# Patient Record
Sex: FEMALE | Race: WHITE | NOT HISPANIC OR LATINO | Employment: OTHER | URBAN - METROPOLITAN AREA
[De-identification: names, ages, dates, MRNs, and addresses within clinical notes are randomized per-mention and may not be internally consistent; named-entity substitution may affect disease eponyms.]

---

## 2018-10-18 ENCOUNTER — HOSPITAL ENCOUNTER (EMERGENCY)
Facility: HOSPITAL | Age: 53
Discharge: HOME | End: 2018-10-18
Attending: EMERGENCY MEDICINE
Payer: COMMERCIAL

## 2018-10-18 VITALS
OXYGEN SATURATION: 98 % | SYSTOLIC BLOOD PRESSURE: 165 MMHG | RESPIRATION RATE: 18 BRPM | HEART RATE: 102 BPM | TEMPERATURE: 98.2 F | DIASTOLIC BLOOD PRESSURE: 108 MMHG

## 2018-10-18 DIAGNOSIS — F41.9 ANXIETY: ICD-10-CM

## 2018-10-18 DIAGNOSIS — K21.00 GASTROESOPHAGEAL REFLUX DISEASE WITH ESOPHAGITIS: Primary | ICD-10-CM

## 2018-10-18 PROCEDURE — 94640 AIRWAY INHALATION TREATMENT: CPT

## 2018-10-18 PROCEDURE — 99283 EMERGENCY DEPT VISIT LOW MDM: CPT

## 2018-10-18 RX ORDER — FAMOTIDINE 20 MG/1
20 TABLET, FILM COATED ORAL ONCE
Status: COMPLETED | OUTPATIENT
Start: 2018-10-18 | End: 2018-10-18

## 2018-10-18 RX ORDER — DULOXETIN HYDROCHLORIDE 60 MG/1
60 CAPSULE, DELAYED RELEASE ORAL DAILY
COMMUNITY

## 2018-10-18 RX ORDER — RANITIDINE 150 MG/1
150 TABLET ORAL 2 TIMES DAILY
Qty: 20 TABLET | Refills: 0 | Status: SHIPPED | OUTPATIENT
Start: 2018-10-18

## 2018-10-18 RX ORDER — MONTELUKAST SODIUM 10 MG/1
10 TABLET ORAL DAILY
COMMUNITY

## 2018-10-18 RX ORDER — SODIUM CHLORIDE FOR INHALATION 0.9 %
3 VIAL, NEBULIZER (ML) INHALATION ONCE
Status: COMPLETED | OUTPATIENT
Start: 2018-10-18 | End: 2018-10-18

## 2018-10-18 RX ORDER — SODIUM CHLORIDE FOR INHALATION 0.9 %
VIAL, NEBULIZER (ML) INHALATION
Status: DISCONTINUED
Start: 2018-10-18 | End: 2018-10-18 | Stop reason: HOSPADM

## 2018-10-18 RX ORDER — LOVASTATIN 40 MG/1
40 TABLET ORAL DAILY
COMMUNITY

## 2018-10-18 RX ORDER — ALUMINA, MAGNESIA, AND SIMETHICONE 2400; 2400; 240 MG/30ML; MG/30ML; MG/30ML
10 SUSPENSION ORAL EVERY 6 HOURS PRN
Qty: 355 ML | Refills: 0 | Status: SHIPPED | OUTPATIENT
Start: 2018-10-18

## 2018-10-18 RX ORDER — MAGNESIUM HYDROXIDE/ALUMINUM HYDROXICE/SIMETHICONE 120; 1200; 1200 MG/30ML; MG/30ML; MG/30ML
30 SUSPENSION ORAL ONCE
Status: COMPLETED | OUTPATIENT
Start: 2018-10-18 | End: 2018-10-18

## 2018-10-18 RX ORDER — GABAPENTIN 300 MG/1
800 CAPSULE ORAL 4 TIMES DAILY
COMMUNITY
End: 2021-08-16

## 2018-10-18 RX ORDER — IPRATROPIUM BROMIDE AND ALBUTEROL SULFATE 2.5; .5 MG/3ML; MG/3ML
3 SOLUTION RESPIRATORY (INHALATION) ONCE
Status: COMPLETED | OUTPATIENT
Start: 2018-10-18 | End: 2018-10-18

## 2018-10-18 RX ORDER — TRAMADOL HYDROCHLORIDE 50 MG/1
50 TABLET ORAL EVERY 6 HOURS PRN
COMMUNITY

## 2018-10-18 RX ORDER — CLONAZEPAM 1 MG/1
1 TABLET ORAL 2 TIMES DAILY PRN
COMMUNITY

## 2018-10-18 RX ADMIN — ALUMINUM HYDROXIDE, MAGNESIUM HYDROXIDE, AND SIMETHICONE 30 ML: 200; 200; 20 SUSPENSION ORAL at 04:45

## 2018-10-18 RX ADMIN — FAMOTIDINE 20 MG: 20 TABLET ORAL at 05:31

## 2018-10-18 RX ADMIN — IPRATROPIUM BROMIDE AND ALBUTEROL SULFATE 3 ML: 2.5; .5 SOLUTION RESPIRATORY (INHALATION) at 04:50

## 2018-10-18 RX ADMIN — LIDOCAINE HYDROCHLORIDE 10 ML: 20 SOLUTION ORAL; TOPICAL at 04:45

## 2018-10-18 RX ADMIN — ISODIUM CHLORIDE 3 ML: 0.03 SOLUTION RESPIRATORY (INHALATION) at 04:50

## 2018-10-18 NOTE — DISCHARGE INSTRUCTIONS
Anxiety   WHAT YOU NEED TO KNOW:   Anxiety is a condition that causes you to feel extremely worried or nervous  The feelings are so strong that they can cause problems with your daily activities or sleep  Anxiety may be triggered by something you fear, or it may happen without a cause  Family or work stress, smoking, caffeine, and alcohol can increase your risk for anxiety  Certain medicines or health conditions can also increase your risk  Anxiety can become a long-term condition if it is not managed or treated  DISCHARGE INSTRUCTIONS:   Call 911 if:   · You have chest pain, tightness, or heaviness that may spread to your shoulders, arms, jaw, neck, or back  · You feel like hurting yourself or someone else  Contact your healthcare provider if:   · Your symptoms get worse or do not get better with treatment  · Your anxiety keeps you from doing your regular daily activities  · You have new symptoms since your last visit  · You have questions or concerns about your condition or care  Medicines:   · Medicines  may be given to help you feel more calm and relaxed, and decrease your symptoms  · Take your medicine as directed  Contact your healthcare provider if you think your medicine is not helping or if you have side effects  Tell him of her if you are allergic to any medicine  Keep a list of the medicines, vitamins, and herbs you take  Include the amounts, and when and why you take them  Bring the list or the pill bottles to follow-up visits  Carry your medicine list with you in case of an emergency  Follow up with your healthcare provider within 2 weeks or as directed:  Write down your questions so you remember to ask them during your visits  Manage anxiety:   · Talk to someone about your anxiety  Your healthcare provider may suggest counseling  Cognitive behavioral therapy can help you understand and change how you react to events that trigger your symptoms   You might feel more comfortable talking with a friend or family member about your anxiety  Choose someone you know will be supportive and encouraging  · Find ways to relax  Activities such as exercise, meditation, or listening to music can help you relax  Spend time with friends, or do things you enjoy  · Practice deep breathing  Deep breathing can help you relax when you feel anxious  Focus on taking slow, deep breaths several times a day, or during an anxiety attack  Breathe in through your nose and out through your mouth  · Create a regular sleep routine  Regular sleep can help you feel calmer during the day  Go to sleep and wake up at the same times every day  Do not watch television or use the computer right before bed  Your room should be comfortable, dark, and quiet  · Eat a variety of healthy foods  Healthy foods include fruits, vegetables, low-fat dairy products, lean meats, fish, whole-grain breads, and cooked beans  Healthy foods can help you feel less anxious and have more energy  · Exercise regularly  Exercise can increase your energy level  Exercise may also lift your mood and help you sleep better  Your healthcare provider can help you create an exercise plan  · Do not smoke  Nicotine and other chemicals in cigarettes and cigars can increase anxiety  Ask your healthcare provider for information if you currently smoke and need help to quit  E-cigarettes or smokeless tobacco still contain nicotine  Talk to your healthcare provider before you use these products  · Do not have caffeine  Caffeine can make your symptoms worse  Do not have foods or drinks that are meant to increase your energy level  · Limit or do not drink alcohol  Ask your healthcare provider if alcohol is safe for you  You may not be able to drink alcohol if you take certain anxiety or depression medicines  Limit alcohol to 1 drink per day if you are a woman  Limit alcohol to 2 drinks per day if you are a man   A drink of alcohol is 12 ounces of beer, 5 ounces of wine, or 1½ ounces of liquor  · Do not use drugs  Drugs can make your anxiety worse  It can also make anxiety hard to manage  Talk to your healthcare provider if you use drugs and want help to quit  © 2017 [employer] Information is for End User's use only and may not be sold, redistributed or otherwise used for commercial purposes  All illustrations and images included in CareNotes® are the copyrighted property of [employer]  or [employer]  The above information is an  only  It is not intended as medical advice for individual conditions or treatments  Talk to your doctor, nurse or pharmacist before following any medical regimen to see if it is safe and effective for you  Gastroesophageal Reflux Disease   WHAT YOU NEED TO KNOW:   Gastroesophageal reflux occurs when acid and food in the stomach back up into the esophagus  Gastroesophageal reflux disease (GERD) is reflux that occurs more than twice a week for a few weeks  It usually causes heartburn and other symptoms  GERD can cause other health problems over time if it is not treated  DISCHARGE INSTRUCTIONS:   Return to the emergency department if:   · You feel full and cannot burp or vomit  · You have severe chest pain and sudden trouble breathing  · Your bowel movements are black, bloody, or tarry-looking  · Your vomit looks like coffee grounds or has blood in it  Contact your healthcare provider if:   · You vomit large amounts, or you vomit often  · You have trouble breathing after you vomit  · You have trouble swallowing, or pain with swallowing  · You are losing weight without trying  · Your symptoms get worse or do not improve with treatment  · You have questions or concerns about your condition or care  Medicines:   · Medicines  are used to decrease stomach acid   Medicine may also be used to help your lower esophageal sphincter and stomach contract (tighten) more  · Take your medicine as directed  Contact your healthcare provider if you think your medicine is not helping or if you have side effects  Tell him of her if you are allergic to any medicine  Keep a list of the medicines, vitamins, and herbs you take  Include the amounts, and when and why you take them  Bring the list or the pill bottles to follow-up visits  Carry your medicine list with you in case of an emergency  Manage GERD:   · Do not have foods or drinks that may increase heartburn  These include chocolate, peppermint, fried or fatty foods, drinks that contain caffeine, or carbonated drinks (soda)  Other foods include spicy foods, onions, tomatoes, and tomato-based foods  Do not have foods or drinks that can irritate your esophagus, such as citrus fruits, juices, and alcohol  · Do not eat large meals  When you eat a lot of food at one time, your stomach needs more acid to digest it  Eat 6 small meals each day instead of 3 large ones, and eat slowly  Do not eat meals 2 to 3 hours before bedtime  · Elevate the head of your bed  Place 6-inch blocks under the head of your bed frame  You may also use more than one pillow under your head and shoulders while you sleep  · Maintain a healthy weight  If you are overweight, weight loss may help relieve symptoms of GERD  · Do not smoke  Smoking weakens the lower esophageal sphincter and increases the risk of GERD  Ask your healthcare provider for information if you currently smoke and need help to quit  E-cigarettes or smokeless tobacco still contain nicotine  Talk to your healthcare provider before you use these products  · Do not wear clothing that is tight around your waist   Tight clothing can put pressure on your stomach and cause or worsen GERD symptoms  Follow up with your healthcare provider as directed:  Write down your questions so you remember to ask them during your visits    © 2017 [facility] [address] is for End User's use only and may not be sold, redistributed or otherwise used for commercial purposes  All illustrations and images included in CareNotes® are the copyrighted property of [employer]  or José Johnston  The above information is an  only  It is not intended as medical advice for individual conditions or treatments  Talk to your doctor, nurse or pharmacist before following any medical regimen to see if it is safe and effective for you

## 2018-10-18 NOTE — ED PROVIDER NOTES
History  Chief Complaint   Patient presents with    Heartburn     pt presents to ER stating she took ibuprofen for her knee, then ate some yogurt, patient then got burning feeling in her throat and began coughing and panicking  patient states she never felt this before     This 67-year-old female with history of asthma, GERD, hiatal hernia, nonalcoholic fatty liver disease, depression and anxiety complains of a burning feeling in her throat with nonproductive cough  She also feels that she is wheezing at times  She states this began after taking ibuprofen and eating yogurt this morning  She never had this feeling before  Recently she has been having GERD symptoms and has been burping a lot  Patient does not feel food or pills stuck her throat  She is not dyspneic has no chest pain  Patient has had no recent fever  She had food poisoning symptoms several weeks ago  Prior to Admission Medications   Prescriptions Last Dose Informant Patient Reported? Taking?    DULoxetine (CYMBALTA) 60 mg delayed release capsule   Yes No   Sig: Take 60 mg by mouth daily   Magnesium Oxide (MAG-OX PO)   Yes No   Sig: Take 400 mg by mouth daily   OMEPRAZOLE PO   Yes No   Sig: Take 20 mg by mouth   albuterol (5 mg/mL) 0 5 % nebulizer solution   Yes No   Sig: Inhale 2 puffs every 4 (four) hours as needed   clonazePAM (KlonoPIN) 1 mg tablet   Yes No   Sig: Take 1 mg by mouth 2 (two) times a day as needed   gabapentin (NEURONTIN) 300 mg capsule   Yes No   Sig: Take 800 mg by mouth 4 (four) times a day   lovastatin (MEVACOR) 40 MG tablet   Yes No   Sig: Take 40 mg by mouth daily   metoprolol tartrate (LOPRESSOR) 25 mg tablet   Yes No   Sig: Take 39 5 mg by mouth daily   montelukast (SINGULAIR) 10 mg tablet   Yes No   Sig: Take 10 mg by mouth daily   traMADol (ULTRAM) 50 mg tablet   Yes No   Sig: Take 50 mg by mouth every 6 (six) hours as needed      Facility-Administered Medications: None       Past Medical History: Diagnosis Date    Anxiety     Arthritis     Asthma     Depression     Glaucoma     Hypertension     Rapid heart rate     Spinal stenosis        Past Surgical History:   Procedure Laterality Date    CYST REMOVAL      HYSTERECTOMY      JOINT REPLACEMENT      REPLACEMENT TOTAL KNEE      TONSILLECTOMY      TUMOR REMOVAL         History reviewed  No pertinent family history  I have reviewed and agree with the history as documented  Social History   Substance Use Topics    Smoking status: Never Smoker    Smokeless tobacco: Never Used    Alcohol use No        Review of Systems   Constitutional: Negative  HENT: Negative  Eyes: Negative  Respiratory: Positive for shortness of breath and wheezing  Cardiovascular: Negative  Gastrointestinal: Positive for abdominal pain, nausea and vomiting  Endocrine: Negative  Genitourinary: Negative  Musculoskeletal: Positive for arthralgias, back pain and myalgias  Skin: Negative  Allergic/Immunologic: Negative  Neurological: Negative  Hematological: Negative  Psychiatric/Behavioral: Positive for dysphoric mood  The patient is nervous/anxious  All other systems reviewed and are negative  Physical Exam  Physical Exam   Constitutional: She is oriented to person, place, and time  She appears well-developed and well-nourished  She appears distressed  HENT:   Head: Normocephalic and atraumatic  Eyes: Pupils are equal, round, and reactive to light  Conjunctivae and EOM are normal    Neck: Normal range of motion  Neck supple  No JVD present  Cardiovascular: Normal rate and regular rhythm  No murmur heard  Pulmonary/Chest: Effort normal  No stridor  No respiratory distress  Wheezes: Very faint expiratory wheeze  Abdominal: Soft  Bowel sounds are normal  She exhibits no mass  There is no tenderness  There is no guarding  Musculoskeletal: Normal range of motion  She exhibits no edema     Neurological: She is alert and oriented to person, place, and time  She has normal reflexes  No cranial nerve deficit  Coordination normal    Skin: Skin is warm and dry  Capillary refill takes less than 2 seconds  No rash noted  She is not diaphoretic  Psychiatric: Her affect is angry, labile and inappropriate  Her speech is rapid and/or pressured  She is agitated  She is not actively hallucinating  Thought content is not paranoid and not delusional  She expresses no homicidal and no suicidal ideation  She expresses no suicidal plans and no homicidal plans  Tearful, inappropriate, raising her voice and complaining about her treatment as soon she arrived  This soon diminished and resolved after the nurse reassured her   Nursing note and vitals reviewed        Vital Signs  ED Triage Vitals [10/18/18 0414]   Temperature Pulse Respirations Blood Pressure SpO2   98 2 °F (36 8 °C) 102 18 (!) 165/108 98 %      Temp Source Heart Rate Source Patient Position - Orthostatic VS BP Location FiO2 (%)   Temporal Monitor Lying Right arm --      Pain Score       --           Vitals:    10/18/18 0414   BP: (!) 165/108   Pulse: 102   Patient Position - Orthostatic VS: Lying       Visual Acuity      ED Medications  Medications   aluminum-magnesium hydroxide-simethicone (MYLANTA) 200-200-20 mg/5 mL oral suspension 30 mL (30 mL Oral Given 10/18/18 0445)   lidocaine viscous (XYLOCAINE) 2 % mucosal solution 10 mL (10 mL Oral Given 10/18/18 0445)   ipratropium-albuterol (DUO-NEB) 0 5-2 5 mg/3 mL inhalation solution 3 mL (3 mL Nebulization Given 10/18/18 0450)   sodium chloride 0 9 % inhalation solution 3 mL (3 mL Nebulization Given 10/18/18 0450)   famotidine (PEPCID) tablet 20 mg (20 mg Oral Given 10/18/18 0531)       Diagnostic Studies  Results Reviewed     None                 No orders to display              Procedures  Procedures       Phone Contacts  ED Phone Contact    ED Course  ED Course as of Oct 18 0538   Thu Oct 18, 2018   0995 Patient has been talking on the phone for least 25 minutes  She sounds normal on the phone and has no distress  MDM  Number of Diagnoses or Management Options  Anxiety: established and worsening  Gastroesophageal reflux disease with esophagitis: established and worsening    CritCare Time    Disposition  Final diagnoses:   Gastroesophageal reflux disease with esophagitis   Anxiety     Time reflects when diagnosis was documented in both MDM as applicable and the Disposition within this note     Time User Action Codes Description Comment    10/18/2018  5:29 AM Norobb Coulterto Add [K21 0] Gastroesophageal reflux disease with esophagitis     10/18/2018  5:30 AM Norobb Ponto Add [F41 9] Anxiety       ED Disposition     ED Disposition Condition Comment    Discharge  Sruthi Mcdaniel discharge to home/self care  Condition at discharge: Stable        Follow-up Information     Follow up With Specialties Details Why Contact Info    Linda Noe MD [facility] Medicine Call today  [address] [address]      your gastroenterologist  Call today            Patient's Medications   Discharge Prescriptions    ALUMINUM-MAGNESIUM HYDROXIDE-SIMETHICONE (MAALOX MAX) 400-400-40 MG/5ML SUSPENSION    Take 10 mL by mouth every 6 (six) hours as needed for indigestion or heartburn       Start Date: 10/18/2018End Date: --       Order Dose: 10 mL       Quantity: 355 mL    Refills: 0    RANITIDINE (ZANTAC) 150 MG TABLET    Take 1 tablet (150 mg total) by mouth 2 (two) times a day       Start Date: 10/18/2018End Date: --       Order Dose: 150 mg       Quantity: 20 tablet    Refills: 0     No discharge procedures on file      ED Provider  Electronically Signed by           Marina Avery DO  10/18/18 2923

## 2021-06-07 ENCOUNTER — TELEPHONE (OUTPATIENT)
Dept: OBGYN CLINIC | Facility: HOSPITAL | Age: 56
End: 2021-06-07

## 2021-06-07 NOTE — TELEPHONE ENCOUNTER
Patient is calling crying because she is having a problem with her finger & checking to see if we have any cancellations  Patient states that she is having a lot of pain & feels there is something very wrong  I let her know that she should call the PCP or go to care now or ED  Patient is also stating that she is having back issues but treated with a pain doctor years ago  Patient was told to contact [facility] & given the phone number

## 2021-06-15 ENCOUNTER — OFFICE VISIT (OUTPATIENT)
Dept: OBGYN CLINIC | Facility: CLINIC | Age: 56
End: 2021-06-15
Payer: COMMERCIAL

## 2021-06-15 VITALS
WEIGHT: 194 LBS | DIASTOLIC BLOOD PRESSURE: 82 MMHG | SYSTOLIC BLOOD PRESSURE: 120 MMHG | BODY MASS INDEX: 34.38 KG/M2 | HEIGHT: 63 IN

## 2021-06-15 DIAGNOSIS — M25.531 RIGHT WRIST PAIN: ICD-10-CM

## 2021-06-15 DIAGNOSIS — M19.041: Primary | ICD-10-CM

## 2021-06-15 PROCEDURE — 99203 OFFICE O/P NEW LOW 30 MIN: CPT | Performed by: ORTHOPAEDIC SURGERY

## 2021-06-15 NOTE — PROGRESS NOTES
Assessment:     1  Degenerative arthritis of little finger of right hand    2  Ganglion cyst of wrist, right        Plan:     Problem List Items Addressed This Visit     None      Visit Diagnoses     Degenerative arthritis of little finger of right hand    -  Primary    Relevant Orders    Ambulatory referral to Hand Surgery    Ganglion cyst of wrist, right        Relevant Orders    Ambulatory referral to Hand Surgery          Findings consistent with right small finger severe arthritis DIP, moderate PIP, most likely ganglion cyst ulnar aspect of the wrist  Imaging and prognosis reviewed  Arthritis has caused deformity of finger with decrease in motion and swelling  I advised patient to start taking the Voltaren gel that were prescribed  I encouraged her to do finger range of motion to prevent stiffness  I will refer patient to see hand specialist for further treatment recommendation  Referral placed for hand surgeon to evaluate  Advised to use oral voltaren anti inflammatory  All patient's questions were answered to her satisfaction  This note is created using dictation transcription  It may contain typographical errors, grammatical errors, improperly dictated words, background noise and other errors  Subjective:     Patient ID: Lenin Sim is a 63 y o  female  Chief Complaint:  63 yr old female RHD in for evaluation of her small finger  [facility] she has had gradual progression of pain in her finger w/o injury  She did bring imaging showing severe arthritis in small finger  Pain with motion, swelling, not able to bend finger  [facility] and [facility]adol  She did get a script for oral voltaren but has not picked up yet  She also has small fluctuance distal ulna dorsally, it does bother her not as bad as small finger  Denies numbness or tingling in hand  Information on patient's intake form was reviewed      Allergy:  Allergies   Allergen Reactions    Bactrim [Sulfamethoxazole-Trimethoprim]     Erythromycin     Fentanyl      Medications:  all current active meds have been reviewed  Past Medical History:  Past Medical History:   Diagnosis Date    Anxiety     Arthritis     Asthma     Depression     Glaucoma     Hypertension     Rapid heart rate     Spinal stenosis      Past Surgical History:  Past Surgical History:   Procedure Laterality Date    CYST REMOVAL      HYSTERECTOMY      JOINT REPLACEMENT      REPLACEMENT TOTAL KNEE      TONSILLECTOMY      TUMOR REMOVAL       Family History:  Family History   Problem Relation Age of Onset    Hypertension Mother     Cancer Father     Hypertension Father      Social History:  Social History     Substance and Sexual Activity   Alcohol Use No     Social History     Substance and Sexual Activity   Drug Use No     Social History     Tobacco Use   Smoking Status Never Smoker   Smokeless Tobacco Never Used     Review of Systems   Constitutional: Negative for chills and fever  HENT: Negative for ear pain and sore throat  Eyes: Negative for pain and visual disturbance  Respiratory: Negative for cough and shortness of breath  Cardiovascular: Negative for chest pain and palpitations  Gastrointestinal: Negative for abdominal pain and vomiting  Genitourinary: Negative for dysuria and hematuria  Musculoskeletal: Positive for arthralgias (Right little finger and wrist, right knee), back pain, gait problem (Ambulate with a cane) and joint swelling (Right little finger)  Skin: Negative for color change and rash  Neurological: Negative for seizures and syncope  Psychiatric/Behavioral: Negative  All other systems reviewed and are negative          Objective:  BP Readings from Last 1 Encounters:   06/15/21 120/82      Wt Readings from Last 1 Encounters:   06/15/21 88 kg (194 lb)      BMI:   Estimated body mass index is 34 37 kg/m² as calculated from the following:    Height as of this encounter: 5' 3" (1 6 m)     Weight as of this encounter: 88 kg (194 lb)  BSA:   Estimated body surface area is 1 91 meters squared as calculated from the following:    Height as of this encounter: 5' 3" (1 6 m)  Weight as of this encounter: 88 kg (194 lb)  Physical Exam  Vitals and nursing note reviewed  Constitutional:       Appearance: Normal appearance  She is well-developed  HENT:      Head: Normocephalic and atraumatic  Right Ear: External ear normal       Left Ear: External ear normal    Eyes:      Extraocular Movements: Extraocular movements intact  Conjunctiva/sclera: Conjunctivae normal    Pulmonary:      Effort: Pulmonary effort is normal    Musculoskeletal:      Cervical back: Neck supple  Skin:     General: Skin is warm and dry  Neurological:      Mental Status: She is alert and oriented to person, place, and time  Deep Tendon Reflexes: Reflexes are normal and symmetric  Psychiatric:         Mood and Affect: Mood normal          Behavior: Behavior normal          Thought Content: Thought content normal          Judgment: Judgment normal        Right Hand Exam     Tenderness   Right hand tenderness location: PIP and DIP joint of small finger  Volar ulnar aspect of wrist     Range of Motion   Wrist   Extension: normal   Flexion: normal   Pronation: normal   Supination: normal   Hand   MP Little: normal   PIP Little: abnormal   DIP Little: abnormal     Muscle Strength   Wrist extension: 5/5   Wrist flexion: 5/5     Tests   Phalens Sign: negative  Tinel's sign (median nerve): negative    Other   Erythema: absent  Scars: absent  Sensation: normal  Pulse: present    Comments:  Palpable soft tissue mass of volar ulnar of wrist     The IP joint deformity and bony prominence noted, consistent with arthritis            I have personally reviewed pertinent films in PACS and my interpretation is xr right hand small finger demonstrates moderate to severe arthritis of PIP and DIP joints with osteophytes   Scribe Attestation    I,:  Citlali Sharma am acting as a scribe while in the presence of the attending physician :       I,:  Jacy Ghosh MD personally performed the services described in this documentation    as scribed in my presence :

## 2021-08-16 ENCOUNTER — OFFICE VISIT (OUTPATIENT)
Dept: OBGYN CLINIC | Facility: CLINIC | Age: 56
End: 2021-08-16
Payer: COMMERCIAL

## 2021-08-16 VITALS — SYSTOLIC BLOOD PRESSURE: 120 MMHG | DIASTOLIC BLOOD PRESSURE: 88 MMHG | BODY MASS INDEX: 34.08 KG/M2 | WEIGHT: 192.4 LBS

## 2021-08-16 DIAGNOSIS — M67.40 GANGLION CYST: ICD-10-CM

## 2021-08-16 DIAGNOSIS — M25.531 RIGHT WRIST PAIN: ICD-10-CM

## 2021-08-16 DIAGNOSIS — M19.041: Primary | ICD-10-CM

## 2021-08-16 PROCEDURE — 99244 OFF/OP CNSLTJ NEW/EST MOD 40: CPT | Performed by: ORTHOPAEDIC SURGERY

## 2021-08-16 PROCEDURE — 20600 DRAIN/INJ JOINT/BURSA W/O US: CPT | Performed by: ORTHOPAEDIC SURGERY

## 2021-08-16 RX ORDER — CLINDAMYCIN HYDROCHLORIDE 300 MG/1
600 CAPSULE ORAL
COMMUNITY

## 2021-08-16 RX ORDER — BETAMETHASONE SODIUM PHOSPHATE AND BETAMETHASONE ACETATE 3; 3 MG/ML; MG/ML
3 INJECTION, SUSPENSION INTRA-ARTICULAR; INTRALESIONAL; INTRAMUSCULAR; SOFT TISSUE
Status: COMPLETED | OUTPATIENT
Start: 2021-08-16 | End: 2021-08-16

## 2021-08-16 RX ORDER — GABAPENTIN 800 MG/1
800 TABLET ORAL 4 TIMES DAILY
COMMUNITY
Start: 2021-07-29

## 2021-08-16 RX ORDER — DORZOLAMIDE HCL 20 MG/ML
SOLUTION/ DROPS OPHTHALMIC
COMMUNITY
Start: 2021-07-29

## 2021-08-16 RX ORDER — HYDROCHLOROTHIAZIDE 25 MG/1
25 TABLET ORAL DAILY
COMMUNITY
Start: 2021-05-10

## 2021-08-16 RX ORDER — LIDOCAINE HYDROCHLORIDE 10 MG/ML
0.5 INJECTION, SOLUTION INFILTRATION; PERINEURAL
Status: COMPLETED | OUTPATIENT
Start: 2021-08-16 | End: 2021-08-16

## 2021-08-16 RX ORDER — PSEUDOEPHEDRINE HCL 30 MG
100 TABLET ORAL
COMMUNITY

## 2021-08-16 RX ADMIN — BETAMETHASONE SODIUM PHOSPHATE AND BETAMETHASONE ACETATE 3 MG: 3; 3 INJECTION, SUSPENSION INTRA-ARTICULAR; INTRALESIONAL; INTRAMUSCULAR; SOFT TISSUE at 17:09

## 2021-08-16 RX ADMIN — LIDOCAINE HYDROCHLORIDE 0.5 ML: 10 INJECTION, SOLUTION INFILTRATION; PERINEURAL at 17:09

## 2021-08-16 NOTE — PROGRESS NOTES
ASSESSMENT/PLAN:    Assessment:   Right small finger arthritis, DIP and PIP joints    Right wrist ganglion cyst     Plan:   Treatment options were discussed being 4 oz of tart cherry juice, Tumeric and [place] dream   Right small finger PIP joint CSI was performed in the office without complication, the CSI can be repeated every 6 months as needed  CSI for the DIP joints tend to not be beneficial, surgical intervention was briefly discussed   OT was ordered for hand exercises, may have a small finger splint made to be worn at night for comfort, this will likely not improve her DIP extension   Ultrasound of the right wrist was ordered to evaluate the ganglion cyst, cyst may be aspirated at that time     Follow Up:  PRN    To Do Next Visit:      General Discussions:  Ganglion Cysts: The anatomy and physiology of the ganglion was discussed with the patient today in the office  Fluid leaking out of the joint surface typically creates a small sac, which can enlarge and cause pain or limitation of motion  Treatment options include observation, aspiration, or surgical incision were discussed with the patient today  Observation typically lead to resolution and approximately 10% of patients, aspiration least resolution approximately 50% of patients, and surgical excision lead to resolution in approximately 97% of patients  After discussion with the patient today, the patient voiced understanding of all treatment options  Osteoarthritis:  The anatomy and physiology of osteoarthritis was discussed with the patient today in the office  Deterioration of the articular cartilage eventually leads to altered mobility at the joint, resulting in joint subluxation, osteophyte formation, cystic changes, as well as subchondral sclerosis  Eventually, pain, limited mobility, and compensatory hypermobility at surrounding joints may develop    While normal activity and usage of the joint may provide a painful experience to the patient, this typically does not result in damage to the limb  Treatment options include splints to decreased joint edema, pain, and inflammation  Therapy exercises to strengthen the surrounding musculature may relieve pain, but do not alter the overall continued development of osteoarthritis  Oral medications, topical medications, corticosteroid injections may decrease pain and increase overall function  Eventually, some patients may require surgical intervention  _____________________________________________________  CHIEF COMPLAINT:  Chief Complaint   Patient presents with    Right Little Finger - Arthritis    Right Wrist - Cyst         SUBJECTIVE:  Jonh Chahal is a 63 y o  female who presents with deformity to her right small finger as well as a mass to her right wrist  I am seeing Rain Gordillo in consultation at the request of Dr Phyllis Gordillo notes pain and deformity to her right small finger  This has been ongoing since aprox March  Pain is located more so to the PIP joint then the DIP joint  [facility] her  Rain Gordillo also notes a right wrist mass, which has been present for multiple years but is growing  Wrist pain will increase with wrist flexion  Her small finger pain does increase when cleaning  Rain Gordillo notes multiple other fingers joints which are also starting to become deformed  She is wondering if there is any way to stop the progression of osteoarthritis  Rain Gordillo does have a significant PMH     Previous Treatments: Toradol Injection with partial relief   Associated symptoms: deformity and pain   Handedness: right  Work status: disabled     PAST MEDICAL HISTORY:  Past Medical History:   Diagnosis Date    Anxiety     Arthritis     Asthma     Depression     Glaucoma     Hypertension     Rapid heart rate     Spinal stenosis        PAST SURGICAL HISTORY:  Past Surgical History:   Procedure Laterality Date    CYST REMOVAL      HYSTERECTOMY      JOINT REPLACEMENT      REPLACEMENT TOTAL KNEE      TONSILLECTOMY      TUMOR REMOVAL         FAMILY HISTORY:  Family History   Problem Relation Age of Onset    Hypertension Mother     Cancer Father     Hypertension Father        SOCIAL HISTORY:  Social History     Tobacco Use    Smoking status: Never Smoker    Smokeless tobacco: Never Used   Substance Use Topics    Alcohol use: No    Drug use: No       MEDICATIONS:    Current Outpatient Medications:     albuterol (5 mg/mL) 0 5 % nebulizer solution, Inhale 2 puffs every 4 (four) hours as needed, Disp: , Rfl:     beclomethasone (Qvar) 40 MCG/ACT inhaler, Inhale 2 puffs 2 (two) times a day, Disp: , Rfl:     clindamycin (CLEOCIN) 300 MG capsule, Take 600 mg by mouth, Disp: , Rfl:     clonazePAM (KlonoPIN) 1 mg tablet, Take 1 mg by mouth 2 (two) times a day as needed, Disp: , Rfl:     Docusate Sodium (DSS) 100 MG CAPS, Take 100 mg by mouth, Disp: , Rfl:     dorzolamide (TRUSOPT) 2 % ophthalmic solution, PUT 1 DROP INTO BOTH EYES TWICE A DAY, Disp: , Rfl:     DULoxetine (CYMBALTA) 60 mg delayed release capsule, Take 60 mg by mouth daily, Disp: , Rfl:     gabapentin (NEURONTIN) 800 mg tablet, Take 800 mg by mouth 4 (four) times a day, Disp: , Rfl:     LISINOPRIL PO, Take 10 mg by mouth daily, Disp: , Rfl:     lovastatin (MEVACOR) 40 MG tablet, Take 40 mg by mouth daily, Disp: , Rfl:     metoprolol tartrate (LOPRESSOR) 25 mg tablet, Take 39 5 mg by mouth daily, Disp: , Rfl:     montelukast (SINGULAIR) 10 mg tablet, Take 10 mg by mouth daily, Disp: , Rfl:     OMEPRAZOLE PO, Take 20 mg by mouth, Disp: , Rfl:     ranitidine (ZANTAC) 150 mg tablet, Take 1 tablet (150 mg total) by mouth 2 (two) times a day, Disp: 20 tablet, Rfl: 0    aluminum-magnesium hydroxide-simethicone (MAALOX MAX) 400-400-40 MG/5ML suspension, Take 10 mL by mouth every 6 (six) hours as needed for indigestion or heartburn, Disp: 355 mL, Rfl: 0   hydrochlorothiazide (HYDRODIURIL) 25 mg tablet, Take 25 mg by mouth daily, Disp: , Rfl:     Magnesium Oxide (MAG-OX PO), Take 400 mg by mouth daily, Disp: , Rfl:     traMADol (ULTRAM) 50 mg tablet, Take 50 mg by mouth every 6 (six) hours as needed, Disp: , Rfl:     ALLERGIES:  Allergies   Allergen Reactions    Hydrocodone-Acetaminophen Other (See Comments) and Nausea Only    Other Other (See Comments)     Shaky    Oxycodone-Acetaminophen Nausea Only and Vomiting    Oxycodone-Aspirin Other (See Comments)    Acetaminophen-Codeine Other (See Comments)    Bactrim [Sulfamethoxazole-Trimethoprim]     Cefprozil Vomiting    Celecoxib Other (See Comments)    Codeine Vomiting    Diflunisal Vomiting    Erythromycin     Fentanyl     Metformin Other (See Comments)    Morphine Itching    Oxaprozin Nausea Only    Oxybutynin Other (See Comments)    Oxycodone Vomiting    Phenobarbital Hyperactivity    Propoxyphene Vomiting    Sulfamethoxazole GI Intolerance    Trimethoprim GI Intolerance    Vancomycin Other (See Comments)    Medical Tape Rash       REVIEW OF SYSTEMS:  Pertinent items are noted in HPI  A comprehensive review of systems was negative      LABS:  HgA1c: No results found for: HGBA1C  BMP: No results found for: GLUCOSE, CALCIUM, NA, K, CO2, CL, BUN, CREATININE      _____________________________________________________  PHYSICAL EXAMINATION:  Vital signs: /88   Wt 87 3 kg (192 lb 6 4 oz)   BMI 34 08 kg/m²   General: well developed and well nourished, alert, oriented times 3 and appears comfortable  Psychiatric: Normal  HEENT: Trachea Midline, No torticollis  Cardiovascular: No discernable arrhythmia  Pulmonary: No wheezing or stridor  Abdomen: No rebound or guarding  Extremities: No peripheral edema  Skin: No Erythema, No Lacerations, No Ulcerations  Neurovascular: Sensation Intact to the Median, Ulnar, Radial Nerve, Motor Intact to the Median, Ulnar, Radial Nerve and Pulses Intact    MUSCULOSKELETAL EXAMINATION:    Right hand/wrist: No erythema or ecchymosis, no warmth, heberden nodules to the index finger, ring finger and small fingers bilaterally, mass 2x2CM volar ulnar aspect of the wrist, mass is non mobile likely stemming from the FCU or ulnar side of the wrist, puochard nodules to the right long and small fingers, mild MCP joint edema, 1CM away from full fist of the ring finger, 3CM away from full fist of the small finger, 1 5CM away from full fist for the long finger  _____________________________________________________  STUDIES REVIEWED:  Images were reviewed in PACS by Dr Galileo Milian and demonstrate: osteoarthritis of the small finger, severe at the DIP joint, moderate to sever of the PIP joint with osteophyte formation  PROCEDURES PERFORMED:  Small joint arthrocentesis: R small PIP  Universal Protocol:  Consent: Verbal consent obtained  Written consent not obtained  Risks and benefits: risks, benefits and alternatives were discussed  Consent given by: patient  Time out: Immediately prior to procedure a "time out" was called to verify the correct patient, procedure, equipment, support staff and site/side marked as required    Patient identity confirmed: verbally with patient    Supporting Documentation  Indications: pain   Procedure Details  Location: small finger - R small PIP  Preparation: Patient was prepped and draped in the usual sterile fashion  Needle size: 25 G  Ultrasound guidance: no  Medications administered: 0 5 mL lidocaine 1 %; 3 mg betamethasone acetate-betamethasone sodium phosphate 6 (3-3) mg/mL    Patient tolerance: patient tolerated the procedure well with no immediate complications  Dressing:  Sterile dressing applied             Scribe Attestation    I,:  Fan rAizmendi am acting as a scribe while in the presence of the attending physician :       I,:  Gertrude Graham MD personally performed the services described in this documentation    as scribed in my presence :

## 2021-08-16 NOTE — PATIENT INSTRUCTIONS
4 OZ tart cherry juice   Tumeric may be helpful (over the counter)   [place] dream (over the counter)   Cortisone injection may be performed 2x a year     What is it Osteoarthritis of the Hand? Arthritis literally means inflamed joint  Normally a joint consists of two smooth, cartilage-covered bone surfaces that fit together as a matched set and that move smoothly against one other  Arthritis results when these smooth surfaces become irregular and don't fit together well anymore and essentially wear out   Arthritis can affect any joint in the body, but it is most noticeable when it affects the hands and fingers  Each hand has 19 bones, plus 8 small bones and the two forearm bones that form the wrist  Arthritis of the hand can be both painful and disabling  The most common forms of arthritis in the hand are osteoarthritis, post-traumatic arthritis (after an injury), and rheumatoid arthritis  Other causes of arthritis of the hand are infection, gout, and psoriasis  Osteoarthritis of the hand  Osteoarthritis is a degenerative joint disease in which the cushioning cartilage that covers the bone surfaces at the joints begins to wear out  It may be caused by simple wear and tear on joints, or it may develop after an injury to a joint  In the hand, osteoarthritis most often develops in three sites (see Figure 1):   at the base of the thumb, where the thumb and wrist come together (the trapezio-metacarpal, or basilar, joint)   at the end joint closest to the finger tip (the distal interphalangeal or DIP joint)   at the middle joint of a finger (the proximal interphalangeal or PIP joint)  It also often develops in the wrist     Signs and symptoms of arthritis of the hand  Stiffness, swelling, and pain are symptoms common to all forms of arthritis in the hand   With osteoarthritis, bony nodules may develop at the middle, or PIP, joint of the finger (Estelle's nodes), and at the end-joints, or DIP, of the finger (Heberden's nodes) (see Figure 2)  A deep, aching pain at the base of the thumb is typical of osteoarthritis of the basilar joint  Swelling and a bump at the base of the thumb where it joins the wrist may also be observed   and pinch strength may be diminished, causing difficulty with activities such as opening jars or turning keys  Pain, swelling, stiffness, and diminished strength are also seen with osteoarthritis of the wrist     How is osteoarthritis diagnosed? Your doctor will examine you and determine whether you have similar symptoms in other joints and assess the impact of the arthritis on your life and activities  The clinical appearance of the hands and fingers helps to diagnose the type of arthritis  X-rays will also show certain characteristics of osteoarthritis, such as narrowing of the joint space, the formation of bony outgrowths (osteophytes or nodes), and the development of dense, hard areas of bone along the joint margins  Treatment  Treatment is designed to relieve pain and restore function  Anti-inflammatory or other analgesic medication may be of benefit in relieving pain  Brief periods of rest may help if the arthritis has flared up  You may also be advised to wear finger or wrist splints at night and for selected activities  Often soft sleeves may be of some benefit when the rigid splints are too restrictive, especially when the arthritis is affecting the joint at the base of your thumb  Heat modalities in the form of warm wax or paraffin baths might help, and when severe swelling is present, cold modalities may be of help  It is important to maintain motion in the fingers and use the hand as productively as possible  Hand therapy is often helpful with these exercises, splints, and modalities  A cortisone injection can often provide relief of symptoms, but does not cure the arthritis  Surgery is usually not advised unless these more conservative treatments fail   Surgery is indicated when the patient either has too much pain or too little function  In most cases, the patient knows best and actually tells the doctor when it is time for surgery  The goal is to restore as much function as possible and to eliminate the pain or reduce it to a tolerable level  One type of surgery is joint fusion, in which the arthritic surface is removed and the bones on each side of the joint are fused together, eliminating motion from the problem joint  Joint fusion may be used to relieve pain and correct deformities that interfere with functioning  Another approach is joint reconstruction, in which the degenerated joint surface is removed in order to eliminate the rough, irregular bone-to-bone contact that causes pain and restricts motion  Once the degenerated portion of the joint surface is removed, it may be replaced with rolled-up soft tissue, such as a tendon, or with a joint replacement implant  Which type of surgery is used depends on the particular joint(s) involved, your activities, and your own needs  Your hand surgeon can help you decide which type of surgery is the most appropriate for you  © American [employer] for [employer] of the Hand  www handcare  org              What is a Ganglion Cyst?  Ganglion cysts are very common lumps within the hand and wrist that occur adjacent to joints or tendons  The most common locations are the top of the wrist (see Figure 1), the palm side of the wrist, the base of the finger on the palm side, and the top of the far joint of the finger (see Figure 2)  The ganglion cyst often resembles a water balloon on a stalk (see Figure 3), and is filled with clear fluid or gel  These cysts may change in size or even disappear completely, and they may or may not be painful  They are not cancerous and will not spread to other areas, but some people form cysts at multiple locations        Causes  The cause of these cysts is unknown, although they may form in the presence of joint or tendon irritation or mechanical changes  They occur in patients of all ages  Diagnosis  The diagnosis is usually based on the location of the lump and its appearance  Ganglion cysts are usually oval or round and may be soft or firm  Cysts at the base of the finger on the palm side are typically very firm, pea-sized nodules that are tender to applied pressure, such as when gripping  Light will often pass through these lumps (transillumination), and this can assist in the diagnosis  Your physician may request x-rays in order to look for evidence of problems in adjacent joints  Cysts at the far joint of the finger frequently have an arthritic bone spur -which is a small bony bump or projection- associated with them, the overlying skin may become thin, and there may be a lengthwise groove in the fingernail just beyond the cyst     Treatment  Treatment can often be non-surgical  In many cases, the cysts can simply be observed, especially if they are painless, because they frequently disappear spontaneously  If the cyst becomes painful, limits activity, or is otherwise unacceptable, several treatment options are available  The use of splints and anti-inflammatory medication can be prescribed in order to decrease pain associated with activities  An aspiration can be performed to remove the fluid from the cyst and decompress it  This requires placing a needle into the cyst, which can be performed in most office settings  Aspiration is a very simple procedure, but recurrence of the cyst is common  If non-surgical options fail to provide relief or if the cyst recurs, surgical alternatives are available  Surgery involves removing the cyst along with a portion of the joint capsule or tendon sheath (see Figure 3)  In the case of wrist ganglion cysts, both traditional open and arthroscopic techniques usually yield good results  Surgical treatment is generally successful although cysts may recur   If there is any question about the diagnosis, excisional biopsy with a pathological examination will better define what the mass is  Your surgeon will discuss the best treatment options for you  © 2012 American Society for [employer] of the Hand  www handcare  org

## 2021-08-17 ENCOUNTER — TELEPHONE (OUTPATIENT)
Dept: OBGYN CLINIC | Facility: CLINIC | Age: 56
End: 2021-08-17

## 2021-08-17 ENCOUNTER — TELEPHONE (OUTPATIENT)
Dept: OBGYN CLINIC | Facility: HOSPITAL | Age: 56
End: 2021-08-17

## 2021-08-17 ENCOUNTER — TELEPHONE (OUTPATIENT)
Dept: OTHER | Facility: OTHER | Age: 56
End: 2021-08-17

## 2021-08-17 NOTE — TELEPHONE ENCOUNTER
Patient has been advised of above  Again recommended waiting 2 weeks after the injection was done in pinky finger to see how she responds to that injection

## 2021-08-17 NOTE — TELEPHONE ENCOUNTER
Dr Max Parnell    [phone]    Patient had a cortisone injection in her right pinky finger on 8/16  She states that she it is very swollen and painful  Please advise

## 2021-08-17 NOTE — TELEPHONE ENCOUNTER
Did schedule next available with Dr Rickie Weaver after the ultrasound as she wishes to review it in the office with Dr Rickie Weaver

## 2021-08-17 NOTE — TELEPHONE ENCOUNTER
Pt states that she is not feeling well due to the injection given in here [place] on 8-16-21  Pt would like a call back 1st thing this mikaela

## 2021-08-17 NOTE — TELEPHONE ENCOUNTER
Patient stated her finger has gotten worse  She feels like the bone spur has gotten bigger  Stated that the pain is excruciating  She stated she cannot live like this  Advised to give the injection 2 weeks to feel effective  She stated she feels like she needs another study done on the finger  Stated she feels like the bone spur is not something she can just live with  Advised continued ice 20 mins on/20 mins off and elevation  Advised that small joint injections are painful compared to large joint injections  Patient asked if she can get an ultrasound for her pinky at the same time as her wrist or if there is another study that can be ordered  She stated she feels like there is something very wrong  Please advise

## 2021-08-18 ENCOUNTER — TELEPHONE (OUTPATIENT)
Dept: OBGYN CLINIC | Facility: MEDICAL CENTER | Age: 56
End: 2021-08-18

## 2021-08-18 NOTE — TELEPHONE ENCOUNTER
I spoke to patient and she states she is not currently not having any fluttering feeling in her chest   She was advised that the PA recommended she go for to ER for evaluation  Patient states she prefers to monitor for symptoms to return, if they return she will go to ER for evaluation  She is complaining of swelling to the hand and pinky after steroid injection  Advised this can happen after steroid injection and may take a few days to calm down  Patient will monitor cap refill which is currently WNL, she will call if cap refill greater than 3 seconds or if swelling gets worse  Advised it should start to improve, denies redness or pain to area  Advised Ice 20 min on 20 min off, elevation above the heart and flex and extension several times per hour  She has taken NSAIDS - Voltaren tabs  She will call in morning if worsening of symptoms  She will go to ER if her chest fluttering returns

## 2021-08-18 NOTE — TELEPHONE ENCOUNTER
Spoke to patient  She stated she has not been feeling well  Stated she is having a lot of pain and swelling  Whole pinky is swollen  Stated ice is not helping reduce the swelling  Hand looks "puffy"  Pinky is stiff as well  Stated she doesn't feel "like herself"  Stated she feels "fatigued" but she always has fatigue  She stated she has a heart condition and usually has heart flutters but she feels like this is worse since the injection  Advised that she should speak to whoever manages these conditions if this is something managed by a provider  Asked for a photo to compare to when seen in the office, she stated she cannot do this  Patient stated that she has had steroid injections many times and never had this reaction  Continues to deny redness or heat to touch  Stated the finger is very swollen compared to the other pinky finger  Stated this is more swollen than before she was seen  Also stated her hand is "puffy" and has been since before the injection  She feels like this is more than before the injection  Patient wants to know what else she should do  Did mention ice 20 mins on/20 mins off  She stated she is concerned for her finger and the swelling and wants the doctor aware

## 2021-08-18 NOTE — TELEPHONE ENCOUNTER
Phone went straight to voicemail  Left voicemail for patient to call back   Please have her go to the ER to evaluate for heart Symptomatology

## 2021-08-18 NOTE — TELEPHONE ENCOUNTER
Patient sees Dr Emmanuel Yeh  Patient calling since the lnjection in her right pinky finger, she has doubled the swelling, pain level of 8, and she can bend it a little bit and she is not feeling well   She is asking for a call back relating this      CB # [phone]

## 2021-08-20 NOTE — TELEPHONE ENCOUNTER
Patient called in stating that her pinky is  swelling hasnt gone down and to pls advise- transferred to clinical -thank you

## 2021-08-20 NOTE — TELEPHONE ENCOUNTER
Patient is calling back stating that her pinky is double the size of her other fingers  No redness, drainage at injection site, increased pain  Good Cap refill  Patient thinks that she needs a diagnostic study  States she has a large bone spur in her pinky joint  Patient states she has received no help from the steroid injection  I did advised again that it will take some time to calm down after steroid injection  She is icing and elevating, gentle ROM, Diclofenac tabs as directed, tylenol 1000mg TID

## 2021-08-23 NOTE — TELEPHONE ENCOUNTER
Can you check in with her? If her finger is this much of a problem then she needs to go to the ER  A bone spur does not need a diagnostic study

## 2021-08-23 NOTE — NURSING NOTE
Call placed to patient to discuss upcoming appointment at [facility] radiology department and complete consultation with patient  Patient is having right wrist evaluation and possible aspiration with US guidance  Reviewed patient's allergies, no current anticoagulant medication present , also discussed the pre and post procedure expectations  Reminded patient of location and time expected for procedure, Patient expressed understanding by verbalizing and repeating instructions

## 2021-08-23 NOTE — TELEPHONE ENCOUNTER
Spoke to patient  She denies worsening of symptoms from when she spoke to us Friday  Denies redness, heat to touch, or drainage  She stated she will give the injection some more time to resolve  She stated she will go to the ER if she feels necessary after a couple of days

## 2021-09-07 ENCOUNTER — HOSPITAL ENCOUNTER (OUTPATIENT)
Dept: RADIOLOGY | Facility: HOSPITAL | Age: 56
Discharge: HOME | End: 2021-09-07
Attending: ORTHOPAEDIC SURGERY | Admitting: RADIOLOGY
Payer: COMMERCIAL

## 2021-09-07 DIAGNOSIS — M67.40 GANGLION CYST: ICD-10-CM

## 2021-09-07 PROCEDURE — 76882 US LMTD JT/FCL EVL NVASC XTR: CPT

## 2021-10-04 ENCOUNTER — OFFICE VISIT (OUTPATIENT)
Dept: OBGYN CLINIC | Facility: CLINIC | Age: 56
End: 2021-10-04
Payer: COMMERCIAL

## 2021-10-04 VITALS
DIASTOLIC BLOOD PRESSURE: 100 MMHG | HEIGHT: 63 IN | SYSTOLIC BLOOD PRESSURE: 136 MMHG | BODY MASS INDEX: 33.35 KG/M2 | WEIGHT: 188.2 LBS

## 2021-10-04 DIAGNOSIS — M67.40 GANGLION CYST: ICD-10-CM

## 2021-10-04 DIAGNOSIS — M19.041: Primary | ICD-10-CM

## 2021-10-04 PROCEDURE — 99214 OFFICE O/P EST MOD 30 MIN: CPT | Performed by: ORTHOPAEDIC SURGERY

## 2021-10-19 ENCOUNTER — TELEPHONE (OUTPATIENT)
Dept: INTERNAL MEDICINE CLINIC | Facility: CLINIC | Age: 56
End: 2021-10-19

## 2021-11-04 ENCOUNTER — TELEPHONE (OUTPATIENT)
Dept: OBGYN CLINIC | Facility: HOSPITAL | Age: 56
End: 2021-11-04

## 2022-02-07 ENCOUNTER — TELEPHONE (OUTPATIENT)
Dept: OBGYN CLINIC | Facility: HOSPITAL | Age: 57
End: 2022-02-07

## 2022-02-07 NOTE — TELEPHONE ENCOUNTER
Patient is rescheduling today's appt today with [facility]  She is not feeling well   She is having pain and a lump on her wrist   She is requesting a call back after 1 pm starting tomorrow

## 2022-05-06 ENCOUNTER — TELEPHONE (OUTPATIENT)
Dept: OBGYN CLINIC | Facility: HOSPITAL | Age: 57
End: 2022-05-06

## 2022-05-06 NOTE — TELEPHONE ENCOUNTER
Patient sees [facility]        Patient had an appointment 5/9 but needed to cancel due to being sick   She would like to reschedule but is asking for a call next week after tuesday after 3:30      CB: [phone]

## 2022-05-31 ENCOUNTER — TELEPHONE (OUTPATIENT)
Dept: OBGYN CLINIC | Facility: HOSPITAL | Age: 57
End: 2022-05-31

## 2022-05-31 NOTE — TELEPHONE ENCOUNTER
Patient sees  [address] [place] [address] and also seen Dr Srinivasan Cisse  She wanted to know as to what the dates were that she was seen in the office with the Dr  She wanted to know as to also how she is able to obtain her medical records

## 2022-05-31 NOTE — TELEPHONE ENCOUNTER
Patient is calling in stating that she is going to call back as she needs to get this paperwork done in order for her to make an appointment

## 2022-06-01 ENCOUNTER — TELEPHONE (OUTPATIENT)
Dept: OBGYN CLINIC | Facility: MEDICAL CENTER | Age: 57
End: 2022-06-01

## 2022-06-01 NOTE — TELEPHONE ENCOUNTER
Hi, Patient calling for appointment with Dr Rubina Alcala for lump on right wrist, she had to cancel due to health issues best time to call is in the afternoon    Sent to [facility]    Patient:  Mortimer Cambridge    V:[id number]    : 1965    Phone: [phone]    Location:  Dr Rubina Manuel

## 2022-08-08 ENCOUNTER — TELEPHONE (OUTPATIENT)
Dept: OBGYN CLINIC | Facility: HOSPITAL | Age: 57
End: 2022-08-08

## 2022-08-08 NOTE — TELEPHONE ENCOUNTER
Patient calling in unsure about what she should do about her appointment for today with Dr Froy Ardon  She states that she is not feeling well today because of her neuropathy in her feet and she did not sleep well last night  She reports that she is losing her home at the end of September  She said she was just not up to par to coming in today  She knows she needs to be seen  She asked when would be next available for Dr Froy Ardon and was trying to decide if she should wait to be seen until October-I confirmed next available with  for Dr Froy Ardon  Patient was ultimately transferred to  so she could get scheduled appropriately

## 2022-10-10 ENCOUNTER — OFFICE VISIT (OUTPATIENT)
Dept: OBGYN CLINIC | Facility: CLINIC | Age: 57
End: 2022-10-10
Payer: COMMERCIAL

## 2022-10-10 VITALS
DIASTOLIC BLOOD PRESSURE: 88 MMHG | WEIGHT: 188 LBS | HEIGHT: 63 IN | SYSTOLIC BLOOD PRESSURE: 134 MMHG | BODY MASS INDEX: 33.31 KG/M2

## 2022-10-10 DIAGNOSIS — R22.31 FOREARM MASS, RIGHT: ICD-10-CM

## 2022-10-10 DIAGNOSIS — M19.041: Primary | ICD-10-CM

## 2022-10-10 DIAGNOSIS — M79.671 PAIN IN RIGHT FOOT: ICD-10-CM

## 2022-10-10 DIAGNOSIS — M67.40 GANGLION CYST: ICD-10-CM

## 2022-10-10 PROCEDURE — 99215 OFFICE O/P EST HI 40 MIN: CPT | Performed by: ORTHOPAEDIC SURGERY

## 2022-10-10 PROCEDURE — 20600 DRAIN/INJ JOINT/BURSA W/O US: CPT | Performed by: ORTHOPAEDIC SURGERY

## 2022-10-10 RX ADMIN — BUPIVACAINE HYDROCHLORIDE 0.5 ML: 2.5 INJECTION, SOLUTION INFILTRATION; PERINEURAL at 17:37

## 2022-10-10 RX ADMIN — BETAMETHASONE SODIUM PHOSPHATE AND BETAMETHASONE ACETATE 3 MG: 3; 3 INJECTION, SUSPENSION INTRA-ARTICULAR; INTRALESIONAL; INTRAMUSCULAR; SOFT TISSUE at 17:37

## 2022-10-10 NOTE — PROGRESS NOTES
ASSESSMENT/PLAN:    Assessment:   Right wrist volar  Mass on ulnar side and right small finger PIP and DIP Osteoarthritis    Plan:   -Resume activities as tolerated, NSAIDs, activity modification, ice, heat and and consult to Rheumatology   -Right small finger PIP joint CSI was performed in the office without complication, the CSI can be repeated every 6 months as needed  -A referral to podiatry was placed today for right foot pain  -Referral for MRI of right wrist and forearm was placed today to further evaluate the mass in forearm and wrist    -Order for X-ray of right small finger was placed- patient must bring disk if going to an outside [facility] location      Follow Up: After Testing    To Do Next Visit:    Review imaging: MRI of right wrist, MRI of right forearm, x-ray of right small finger    General Discussions:     Ganglion Cysts: The anatomy and physiology of the ganglion was discussed with the patient today in the office  Fluid leaking out of the joint surface typically creates a small sac, which can enlarge and cause pain or limitation of motion  Treatment options include observation, aspiration, or surgical incision were discussed with the patient today  Observation typically lead to resolution and approximately 10% of patients, aspiration least resolution approximately 50% of patients, and surgical excision lead to resolution in approximately 97% of patients  After discussion with the patient today, the patient voiced understanding of all treatment options  Osteoarthritis:  The anatomy and physiology of osteoarthritis was discussed with the patient today in the office  Deterioration of the articular cartilage eventually leads to altered mobility at the joint, resulting in joint subluxation, osteophyte formation, cystic changes, as well as subchondral sclerosis  Eventually, pain, limited mobility, and compensatory hypermobility at surrounding joints may develop    While normal activity and usage of the joint may provide a painful experience to the patient, this typically does not result in damage to the limb  Treatment options include splints to decreased joint edema, pain, and inflammation  Therapy exercises to strengthen the surrounding musculature may relieve pain, but do not alter the overall continued development of osteoarthritis  Oral medications, topical medications, corticosteroid injections may decrease pain and increase overall function  Eventually, some patients may require surgical intervention  Operative Discussions:       _____________________________________________________  CHIEF COMPLAINT:  Chief Complaint   Patient presents with   • Right Wrist - Cyst   • Right Little Finger - Follow-up         SUBJECTIVE:  Americo Brown is a 61 y o  female who presents for follow up regarding Volar wrist ganglion cyst right and Arthritis of the right small finger PIP and DIP osteoarthritis  Patient was last seen on 10/4/2021  Patient states that the ganglion cyst on the volar wrist has grown in size  Patient also notes that the arthritis in her PIP and DIP joints have gotten worse and are painful  Patient received a CSI to the right PIP joint on 8/16/2022  Patient would like a repeat injection today  Patient also states that she has a lump on the dorsal aspect of the right forearm  Last visit patient was offered surgery for removal of lipoma on the ulnar side of the wrist and was not interested at the time and is still not today  A referral to rheumatology was placed last visit- patient did not schedule         PAST MEDICAL HISTORY:  Past Medical History:   Diagnosis Date   • Anxiety    • Arthritis    • Asthma    • Depression    • Glaucoma    • Hypertension    • Rapid heart rate    • Spinal stenosis        PAST SURGICAL HISTORY:  Past Surgical History:   Procedure Laterality Date   • CYST REMOVAL     • HYSTERECTOMY     • JOINT REPLACEMENT     • REPLACEMENT TOTAL KNEE     • TONSILLECTOMY • TUMOR REMOVAL         FAMILY HISTORY:  Family History   Problem Relation Age of Onset   • Hypertension Mother    • Cancer Father    • Hypertension Father        SOCIAL HISTORY:  Social History     Tobacco Use   • Smoking status: Never Smoker   • Smokeless tobacco: Never Used   Substance Use Topics   • Alcohol use: No   • Drug use: No       MEDICATIONS:    Current Outpatient Medications:   •  albuterol (5 mg/mL) 0 5 % nebulizer solution, Inhale 2 puffs every 4 (four) hours as needed, Disp: , Rfl:   •  beclomethasone (Qvar) 40 MCG/ACT inhaler, Inhale 2 puffs 2 (two) times a day, Disp: , Rfl:   •  clindamycin (CLEOCIN) 300 MG capsule, Take 600 mg by mouth, Disp: , Rfl:   •  clonazePAM (KlonoPIN) 1 mg tablet, Take 1 mg by mouth 2 (two) times a day as needed, Disp: , Rfl:   •  Diclofenac Sodium (VOLTAREN) 1 %, APPLY TO AFFECTED AREA THREE TIMES A DAY AS NEEDED FOR PAIN, Disp: , Rfl:   •  Docusate Sodium (DSS) 100 MG CAPS, Take 100 mg by mouth, Disp: , Rfl:   •  dorzolamide (TRUSOPT) 2 % ophthalmic solution, PUT 1 DROP INTO BOTH EYES TWICE A DAY, Disp: , Rfl:   •  DULoxetine (CYMBALTA) 60 mg delayed release capsule, Take 60 mg by mouth daily, Disp: , Rfl:   •  gabapentin (NEURONTIN) 800 mg tablet, Take 800 mg by mouth 4 (four) times a day, Disp: , Rfl:   •  hydrochlorothiazide (HYDRODIURIL) 25 mg tablet, Take 25 mg by mouth daily, Disp: , Rfl:   •  lovastatin (MEVACOR) 40 MG tablet, Take 40 mg by mouth daily, Disp: , Rfl:   •  metoprolol tartrate (LOPRESSOR) 25 mg tablet, Take 39 5 mg by mouth daily, Disp: , Rfl:   •  montelukast (SINGULAIR) 10 mg tablet, Take 10 mg by mouth daily, Disp: , Rfl:   •  OMEPRAZOLE PO, Take 20 mg by mouth, Disp: , Rfl:   •  aluminum-magnesium hydroxide-simethicone (MAALOX MAX) 400-400-40 MG/5ML suspension, Take 10 mL by mouth every 6 (six) hours as needed for indigestion or heartburn, Disp: 355 mL, Rfl: 0  •  LISINOPRIL PO, Take 10 mg by mouth daily, Disp: , Rfl:   •  Magnesium Oxide (MAG-OX PO), Take 400 mg by mouth daily, Disp: , Rfl:   •  ranitidine (ZANTAC) 150 mg tablet, Take 1 tablet (150 mg total) by mouth 2 (two) times a day, Disp: 20 tablet, Rfl: 0  •  traMADol (ULTRAM) 50 mg tablet, Take 50 mg by mouth every 6 (six) hours as needed, Disp: , Rfl:     ALLERGIES:  Allergies   Allergen Reactions   • Hydrocodone-Acetaminophen Other (See Comments) and Nausea Only   • Other Other (See Comments)     Shaky   • Oxycodone-Acetaminophen Nausea Only and Vomiting   • Oxycodone-Aspirin Other (See Comments)   • Acetaminophen-Codeine Other (See Comments)   • Bactrim [Sulfamethoxazole-Trimethoprim]    • Cefprozil Vomiting   • Celecoxib Other (See Comments)   • Codeine Vomiting   • Diflunisal Vomiting   • Erythromycin    • Fentanyl    • Metformin Other (See Comments)   • Morphine Itching   • Oxaprozin Nausea Only   • Oxybutynin Other (See Comments)   • Oxycodone Vomiting   • Phenobarbital Hyperactivity   • Propoxyphene Vomiting   • Sulfamethoxazole GI Intolerance   • Trimethoprim GI Intolerance   • Vancomycin Other (See Comments)   • Medical Tape Rash       REVIEW OF SYSTEMS:  Pertinent items are noted in HPI  A comprehensive review of systems was negative      LABS:  HgA1c: No results found for: HGBA1C  BMP: No results found for: GLUCOSE, CALCIUM, NA, K, CO2, CL, BUN, CREATININE      _____________________________________________________  PHYSICAL EXAMINATION:  Vital signs: /88   Ht 5' 3" (1 6 m)   Wt 85 3 kg (188 lb)   BMI 33 30 kg/m²   General: well developed and well nourished, alert, oriented times 3 and appears comfortable  Psychiatric: Normal  HEENT: Trachea Midline, No torticollis  Cardiovascular: No discernable arrhythmia  Pulmonary: No wheezing or stridor  Abdomen: No rebound or guarding  Extremities: No peripheral edema  Skin: No masses, erythema, lacerations, fluctation, ulcerations  Neurovascular: Sensation Intact to the Median, Ulnar, Radial Nerve, Motor Intact to the Median, Ulnar, Radial Nerve and Pulses Intact    MUSCULOSKELETAL EXAMINATION:  RIGHT SIDE:  Finger:  Tenderness small PIP and DIP joints, tenderness over volar ulnar and 4x4 cm mass corey volar ulna      _____________________________________________________  STUDIES REVIEWED:  No Studies to review      PROCEDURES PERFORMED:  Small joint arthrocentesis: R small PIP  Universal Protocol:  Consent: Verbal consent obtained  Risks and benefits: risks, benefits and alternatives were discussed  Consent given by: patient  Time out: Immediately prior to procedure a "time out" was called to verify the correct patient, procedure, equipment, support staff and site/side marked as required    Timeout called at: 10/10/2022 5:34 PM   Patient understanding: patient states understanding of the procedure being performed  Site marked: the operative site was marked  Patient identity confirmed: verbally with patient    Supporting Documentation  Indications: pain   Procedure Details  Location: small finger - R small PIP  Preparation: Patient was prepped and draped in the usual sterile fashion  Needle size: 25 G  Ultrasound guidance: no  Approach: dorsal  Medications administered: 3 mg betamethasone acetate-betamethasone sodium phosphate 6 (3-3) mg/mL; 0 5 mL bupivacaine 0 25 %    Patient tolerance: patient tolerated the procedure well with no immediate complications  Dressing:  Sterile dressing applied              Scribe Attestation    I,:  Nubia Martini am acting as a scribe while in the presence of the attending physician :       I,:  Shaina Jackson MD personally performed the services described in this documentation    as scribed in my presence :         Scribe Attestation    I,:  Nubia Martini am acting as a scribe while in the presence of the attending physician :       I,:  Shaina Jackson MD personally performed the services described in this documentation    as scribed in my presence :

## 2022-10-11 RX ORDER — BUPIVACAINE HYDROCHLORIDE 2.5 MG/ML
0.5 INJECTION, SOLUTION INFILTRATION; PERINEURAL
Status: COMPLETED | OUTPATIENT
Start: 2022-10-10 | End: 2022-10-10

## 2022-10-11 RX ORDER — BETAMETHASONE SODIUM PHOSPHATE AND BETAMETHASONE ACETATE 3; 3 MG/ML; MG/ML
3 INJECTION, SUSPENSION INTRA-ARTICULAR; INTRALESIONAL; INTRAMUSCULAR; SOFT TISSUE
Status: COMPLETED | OUTPATIENT
Start: 2022-10-10 | End: 2022-10-10

## 2022-10-21 ENCOUNTER — TELEPHONE (OUTPATIENT)
Dept: OBGYN CLINIC | Facility: CLINIC | Age: 57
End: 2022-10-21

## 2022-10-27 ENCOUNTER — TELEPHONE (OUTPATIENT)
Dept: OBGYN CLINIC | Facility: CLINIC | Age: 57
End: 2022-10-27

## 2022-12-09 ENCOUNTER — TELEPHONE (OUTPATIENT)
Dept: OBGYN CLINIC | Facility: CLINIC | Age: 57
End: 2022-12-09

## 2023-01-09 ENCOUNTER — TELEPHONE (OUTPATIENT)
Dept: OBGYN CLINIC | Facility: HOSPITAL | Age: 58
End: 2023-01-09

## 2023-01-09 NOTE — TELEPHONE ENCOUNTER
Caller: Patient  Doctor: [facility]    Reason for call: Patient had to cancel visit because she is sick.  She requests call back on Thursday if possible to reschedule preferably after 1pm    Call back#: [phone]

## 2025-03-23 ENCOUNTER — NURSE TRIAGE (OUTPATIENT)
Dept: OTHER | Facility: OTHER | Age: 60
End: 2025-03-23

## 2025-03-24 NOTE — TELEPHONE ENCOUNTER
"FOLLOW UP: please contact patient to scheduled follow up ASAP    REASON FOR CONVERSATION: Mass    SYMPTOMS:  volar wrist ganglion cyst growing since last seen oct 2022- now size of cherry tomato. worsening weakness in hand, pain keeping her up at night over series of weeks, but much worse over last couple of days. Normal color and temp of hand.     OTHER: previously dx with volar wrist ganglion cyst- was previously offered surgery but declined and lost to follow up    DISPOSITION: Go to ED Now (or PCP Triage) Advised ED evaluation tonight due to abrupt worsening of weakness and pain over last couple of days. Pt verbalized understanding of recommendation.  Reason for Disposition   Patient sounds very sick or weak to the triager    Answer Assessment - Initial Assessment Questions  1. APPEARANCE of SWELLING: \"What does it look like?\"      Progression of volar cyst    2. SIZE: \"How large is the swelling?\" (e.g., inches, cm; or compare to size of pinhead, tip of pen, eraser, coin, pea, grape, ping pong ball)       Kuo tomato sized    3. LOCATION: \"Where is the swelling located?\"     Was on the lateral side of wrist and has now moved under      4. ONSET: \"When did the swelling start?\"      Started prior to 2022    5. COLOR: \"What color is it?\" \"Is there more than one color?\"      Normal skin colored    6. PAIN: \"Is there any pain?\" If Yes, ask: \"How bad is the pain?\" (Scale 1-10; or mild, moderate, severe)        Pretty sore, sometimes keeps her up at night    7. ITCH: \"Does it itch?\" If Yes, ask: \"How bad is the itch?\"       no    8. CAUSE: \"What do you think caused the swelling?\"      Previously diagnosed volar cyst    9 OTHER SYMPTOMS: \"Do you have any other symptoms?\" (e.g., fever)      Cramps in hands for last couple of months      Trembles sometimes      Weakness getting worse the past couple of weeks    Protocols used: Skin Lump or Localized Swelling-Adult-AH    "

## 2025-03-24 NOTE — TELEPHONE ENCOUNTER
"Regarding: bump on wrist / numbness / pain  ----- Message from Ellyn VIGIL sent at 3/23/2025  8:33 PM EDT -----  \"I have a bump on the side of my wrist and I noticed more lately it has been moving towards the inside of my wrist and I've been having some numbness and pain, I can feel it in the joints in my hands and it keeps me up at night and I'm afraid\"    "

## 2025-03-24 NOTE — TELEPHONE ENCOUNTER
Appt made for mon 3/31 @ 9 am Andreea will obtain mri disc and report from [facility] for this appt.

## 2025-03-28 ENCOUNTER — HOSPITAL ENCOUNTER (EMERGENCY)
Dept: HOSPITAL 99 - EMR | Age: 60
LOS: 1 days | Discharge: HOME | End: 2025-03-29
Payer: COMMERCIAL

## 2025-03-28 VITALS — DIASTOLIC BLOOD PRESSURE: 95 MMHG | RESPIRATION RATE: 16 BRPM | SYSTOLIC BLOOD PRESSURE: 133 MMHG

## 2025-03-28 VITALS — BODY MASS INDEX: 32.9 KG/M2

## 2025-03-28 VITALS — DIASTOLIC BLOOD PRESSURE: 81 MMHG | SYSTOLIC BLOOD PRESSURE: 121 MMHG

## 2025-03-28 VITALS — SYSTOLIC BLOOD PRESSURE: 125 MMHG | DIASTOLIC BLOOD PRESSURE: 85 MMHG

## 2025-03-28 VITALS — SYSTOLIC BLOOD PRESSURE: 133 MMHG | DIASTOLIC BLOOD PRESSURE: 88 MMHG

## 2025-03-28 DIAGNOSIS — I10: ICD-10-CM

## 2025-03-28 DIAGNOSIS — F41.9: ICD-10-CM

## 2025-03-28 DIAGNOSIS — F32.A: ICD-10-CM

## 2025-03-28 DIAGNOSIS — R51.9: Primary | ICD-10-CM

## 2025-03-28 LAB
ALBUMIN SERPL-MCNC: 4.5 G/DL (ref 3.5–5)
ALP SERPL-CCNC: 107 U/L (ref 38–126)
ALT SERPL-CCNC: 24 U/L (ref 0–35)
AST SERPL-CCNC: 33 U/L (ref 14–36)
BUN SERPL-MCNC: 5 MG/DL (ref 7–17)
CALCIUM SERPL-MCNC: 9.6 MG/DL (ref 8.4–10.2)
CHLORIDE SERPL-SCNC: 98 MMOL/L (ref 98–107)
CO2 SERPL-SCNC: 27 MMOL/L (ref 22–30)
EGFR: > 60
ERYTHROCYTE [DISTWIDTH] IN BLOOD BY AUTOMATED COUNT: 12.4 % (ref 11.5–14.5)
GLUCOSE SERPL-MCNC: 135 MG/DL (ref 70–99)
HCT VFR BLD AUTO: 38.4 % (ref 37–47)
HGB BLD-MCNC: 13.3 G/DL (ref 12–16)
MCHC RBC AUTO-ENTMCNC: 34.6 G/DL (ref 33–37)
MCV RBC AUTO: 85.3 FL (ref 81–99)
NRBC BLD AUTO-RTO: 0 %
PLATELET # BLD AUTO: 264 10^3/UL (ref 130–400)
POTASSIUM SERPL-SCNC: 4.6 MMOL/L (ref 3.5–5.1)
PROT SERPL-MCNC: 7.8 G/DL (ref 6.3–8.2)
SODIUM SERPL-SCNC: 135 MMOL/L (ref 135–145)

## 2025-03-28 PROCEDURE — 99284 EMERGENCY DEPT VISIT MOD MDM: CPT

## 2025-03-28 PROCEDURE — 96372 THER/PROPH/DIAG INJ SC/IM: CPT

## 2025-03-28 RX ADMIN — KETOROLAC TROMETHAMINE 15 MG: 15 INJECTION, SOLUTION INTRAMUSCULAR; INTRAVENOUS at 23:47

## 2025-03-30 ENCOUNTER — TELEPHONE (OUTPATIENT)
Dept: OTHER | Facility: OTHER | Age: 60
End: 2025-03-30

## 2025-03-31 NOTE — TELEPHONE ENCOUNTER
Patient is calling regarding cancelling an appointment.     Date/Time:03/31/25 at 8:45 am     Patient was rescheduled: YES [ ] NO [X ]     Patient requesting call back to reschedule: YES [X ] NO [ ]    Pt is sick. Please call pt back to reschedule.

## 2025-04-14 ENCOUNTER — NURSE TRIAGE (OUTPATIENT)
Dept: OTHER | Facility: OTHER | Age: 60
End: 2025-04-14

## 2025-04-14 NOTE — TELEPHONE ENCOUNTER
"Regarding: headaches/ body pains  ----- Message from Sadia LOZADA sent at 4/14/2025  4:03 AM EDT -----  \"I have been having bad headaches. I am having bad problems with my hand and wrist as well.\"    "

## 2025-04-14 NOTE — TELEPHONE ENCOUNTER
"FOLLOW UP: Please call for appointment. She had cancelled it, but wants to come in. She states she will call to make an appointment because she has an appointment tomorrow and does not want to miss the call.    REASON FOR CONVERSATION: Wrist Pain    SYMPTOMS: ongoing headache, wrist pain    OTHER: headaches have been ongoing    DISPOSITION: See PCP Within 2 Weeks    Reason for Disposition   Headache is a chronic symptom (recurrent or ongoing AND present > 4 weeks)    Answer Assessment - Initial Assessment Questions  1. ONSET: \"When did the pain start?\"      Had called on 3/23 for same, had appointment scheduled and cancelled it because she was uncomfortable     2. LOCATION: \"Where is the pain located?\"      Left middle finger/ hand       3. PAIN: \"How bad is the pain?\" (Scale 1-10; or mild, moderate, severe)      \"My middle finger is literally double and a half the size\"    5. CAUSE: \"What do you think is causing the pain?\"      Does have a history of osteoarthritis     7. OTHER SYMPTOMS: \"Do you have any other symptoms?\" (e.g., fever, neck pain, numbness or tingling, rash, swelling)   \"I was feeling around my head, and I feel these soft spots and dents in my skull\"  Fatigue   Had a CT head recently    Answer Assessment - Initial Assessment Questions  1. LOCATION: \"Where does it hurt?\"       Reports \"soft spots\" all over her head.     2. ONSET: \"When did the headache start?\" (e.g., minutes, hours, days)       \"It just started again\"     3. PATTERN: \"Does the pain come and go, or has it been constant since it started?\"      Coming and going since the end of march     4. SEVERITY: \"How bad is the pain?\" and \"What does it keep you from doing?\"  (e.g., Scale 1-10; mild, moderate, or severe)      \"Right now I have a really bad headache\"    5. RECURRENT SYMPTOM: \"Have you ever had headaches before?\" If Yes, ask: \"When was the last time?\" and \"What happened that time?\"       Had this at the end of march. She was seen and " "had a CT head which was ok     6. CAUSE: \"What do you think is causing the headache?\"      \"I know I'm low in vitamin D\"  Due for bloodwork     9. OTHER SYMPTOMS: \"Do you have any other symptoms?\" (e.g., fever, stiff neck, eye pain, sore throat, cold symptoms)      \"I have at least over 8 lumps and soft spots all over my head\"  Fatigue   Wrist pain   \"And then I have problems with my feet. I'm basically disabled because of my feet, because I had knee replacement surgery and got neuropathy and I'm in chronic pain. I'm taking a lot of gabapentin, I've been taking it for years and I'm wondering if that could have any side effects.\"    Had a brain MRI at some point, which showed \"lesions on her brain.\" Patient reports \"some sinus type thing going on.\"    Protocols used: Headache-Adult-AH, Wrist Pain-Adult-AH    "

## 2025-04-18 ENCOUNTER — HOSPITAL ENCOUNTER (OUTPATIENT)
Dept: HOSPITAL 99 - RAD | Age: 60
End: 2025-04-18
Payer: COMMERCIAL

## 2025-04-18 DIAGNOSIS — S90.31XA: Primary | ICD-10-CM

## 2025-05-07 ENCOUNTER — NURSE TRIAGE (OUTPATIENT)
Dept: OTHER | Facility: OTHER | Age: 60
End: 2025-05-07

## 2025-05-07 NOTE — TELEPHONE ENCOUNTER
"FOLLOW UP: Please call patient to follow up and schedule an appointment. She prefers to be called after 12 pm.     REASON FOR CONVERSATION: Knee Pain    SYMPTOMS: Bilateral knees are feeling very weak and has been having buckling since the weekend. Patient fell on 4/16/25 having issues since. Cut to left knee is not healing well and right knee is swollen. Having pain up to 7/10. Also stating she is having issues with swelling and pain to her right wrist and right finger. Denies fever and other symptoms.     OTHER: Patient has a history of bilateral knee replacement with the last revision October 2016. Called after falling on 4/16/25 and now having weakness, clicking, and buckling to bilateral knees the past few days. Advised patient to take tylenol/motrin for pain and discomfort, ice and elevating, and monitor for worsening symptoms. Advised to call back if needing further assistance, worsening symptoms such as pain, or further concerns, verbalized understanding. Patient is requesting to see. Dr. León.     DISPOSITION: See PCP Within 3 Days      Reason for Disposition   Knee giving way (or buckling) when walking    Answer Assessment - Initial Assessment Questions  1. MECHANISM: \"How did the injury happen?\" (e.g., twisting injury, direct blow)          Fell onto bilateral knees on 4/16 after fainting     2. ONSET: \"When did the injury happen?\" (e.g., minutes, hours ago)         4/16/25    3. LOCATION: \"Where is the injury located?\"         Left knee has cut but right knee is swollen.     4. APPEARANCE of INJURY: \"What does the injury look like?\"         Knee is flat and the surgical scar broke open after falling.     5. SEVERITY: \"Can you put weight on that leg?\" \"Can you walk?\"         Can walk but having weakness and buckling.    6. SIZE: For cuts, bruises, or swelling, ask: \"How large is it?\" (e.g., inches or centimeters;  entire joint)         Cut is not healing well but does look infected per patient. " "Swelling to right knee more then left.     7. PAIN: \"Is there pain?\" If Yes, ask: \"How bad is the pain?\"   \"What does it keep you from doing?\" (Scale 0-10; or none, mild, moderate, severe)        Pain only to right knee 7/10    8. TETANUS: For any breaks in the skin, ask: \"When was the last tetanus booster?\"        JOSEPH    9. OTHER SYMPTOMS: \"Do you have any other symptoms?\"  (e.g., \"pop\" when knee injured, swelling, locking, buckling)         Knee buckling and weakness that started over the weekend. Hearing clicking when bending knees. Problem to right wrist. 8 soft spots on head. Has problems with her feet since having bilateral knee replacements.    Protocols used: Knee Injury-Adult-    "

## 2025-05-07 NOTE — TELEPHONE ENCOUNTER
Lmom, explained Dr León is our hand surgeon, to call back and to get scheduled with one of our knee doctors

## 2025-05-08 ENCOUNTER — TELEPHONE (OUTPATIENT)
Age: 60
End: 2025-05-08

## 2025-05-08 NOTE — TELEPHONE ENCOUNTER
Caller: Patient    Doctor:  N/a    Reason for call: Patient called to schedule appointment for BL knee, patient had replacement surgery 2011 and a revision 2016 and patient is unable to receive clinical notes, offered next available appointment with Dr. Sosa patient declined to schedule will call PCP and call back.    Call back#: [phone]

## 2025-05-09 ENCOUNTER — NURSE TRIAGE (OUTPATIENT)
Dept: OTHER | Facility: OTHER | Age: 60
End: 2025-05-09

## 2025-05-09 NOTE — TELEPHONE ENCOUNTER
"FOLLOW UP: Please call if appointment can be moved up    REASON FOR CONVERSATION: Advice Only    SYMPTOMS: knee pain     OTHER: Inquiring if her appointment can be moved up     DISPOSITION: Home Care (Information or Advice Only Call)    Reason for Disposition  • [1] Caller requesting NON-URGENT health information AND [2] PCP's office is the best resource    Answer Assessment - Initial Assessment Questions  1. REASON FOR CALL: \"What is the main reason for your call?\" or \"How can I best help you?\"      Patient wants to know what would happen if she ends up in the emergency room d/t knee pain. \"Could they see my chart?\"   \"My knee maegan sometimes. Could I just see like a regular ortho miles in the meantime before they can see me in June?\"     2. SYMPTOMS : \"Do you have any symptoms?\"       Knee keeps giving out   Ortho cannot see her until mid June    Protocols used: Information Only Call - No Triage-Adult-    "

## 2025-07-17 ENCOUNTER — NURSE TRIAGE (OUTPATIENT)
Dept: OTHER | Facility: OTHER | Age: 60
End: 2025-07-17

## 2025-07-17 NOTE — TELEPHONE ENCOUNTER
"REASON FOR CONVERSATION: Wrist Pain and Hand Pain    SYMPTOMS: Right ganglion cyst getting larger, right wrist and hand pain/swelling, right third digit swelling that has been worsening.    OTHER HEALTH INFORMATION:  Pt is concerned that a piece of her ganglion cyst may have broken off and may be interfering with her vein.    PROTOCOL DISPOSITION: See PCP Within 3 Days    CARE ADVICE PROVIDED: Attempted to schedule an appointment for patient, but she would like to be seen as soon as possible (requesting an afternoon appointment if possible).    PRACTICE FOLLOW-UP:  Please contact pt to follow up regarding an appointment.  Patient requesting a return call at 10:00 or after.      Reason for Disposition   [1] MODERATE pain (e.g., interferes with normal activities) AND [2] present > 3 days    Answer Assessment - Initial Assessment Questions  1. ONSET: \"When did the pain start?\"        Has had for \"awhile\" but getting worse over the past week (and for the past 2 days, even worse)    2. LOCATION: \"Where is the pain located?\"        Right wrist and hand    3. PAIN: \"How bad is the pain?\" (Scale 1-10; or mild, moderate, severe)        Moderate to severe    4. CAUSE: \"What do you think is causing the pain?\"        Ganglion cyst- getting bigger     5. OTHER SYMPTOMS: \"Do you have any other symptoms?\" (e.g., fever, neck pain, numbness or tingling, rash, swelling)        Right hand stiffness, pressure, and swelling.  Also reports right third digit swelling for years that is worsening.  Reports that she is also having generalized weakness and dizziness at times (discussed seeing her PCP for these issues).    Pt is concerned that a piece of her ganglion cyst may have broken off and may be interfering with her vein.    Protocols used: Wrist Pain-Adult-AH    " None

## 2025-07-24 ENCOUNTER — TELEPHONE (OUTPATIENT)
Age: 60
End: 2025-07-24

## 2025-07-24 NOTE — TELEPHONE ENCOUNTER
Caller: patient    Doctor: Dr. León     Reason for call: Patient would like to know if there is any paper work to be filled out can it be mailed to her, patient states it takes a little time for her to fill out paperwork and would rather do it at home    Verified correct address on chart     Call back#: [phone]

## 2025-07-24 NOTE — TELEPHONE ENCOUNTER
Lmom, not aware of any paperwork that needs to be filled out, not aware of any paperwork that would need to be filled out at check in, gave cb number with any questions

## 2025-07-29 ENCOUNTER — TELEPHONE (OUTPATIENT)
Dept: OTHER | Facility: OTHER | Age: 60
End: 2025-07-29

## 2025-07-30 ENCOUNTER — OFFICE VISIT (OUTPATIENT)
Dept: DERMATOLOGY | Facility: CLINIC | Age: 60
End: 2025-07-30

## 2025-07-30 DIAGNOSIS — D48.9 NEOPLASM OF UNCERTAIN BEHAVIOR: Primary | ICD-10-CM

## 2025-07-30 DIAGNOSIS — D18.01 CHERRY ANGIOMA: ICD-10-CM

## 2025-07-30 DIAGNOSIS — L82.1 SEBORRHEIC KERATOSES: ICD-10-CM

## 2025-07-30 DIAGNOSIS — M67.40 GANGLION CYST: ICD-10-CM

## 2025-07-30 PROCEDURE — 88305 TISSUE EXAM BY PATHOLOGIST: CPT | Performed by: PATHOLOGY

## 2025-08-05 ENCOUNTER — TELEPHONE (OUTPATIENT)
Dept: OBGYN CLINIC | Facility: CLINIC | Age: 60
End: 2025-08-05

## 2025-08-15 ENCOUNTER — TELEPHONE (OUTPATIENT)
Dept: OBGYN CLINIC | Facility: CLINIC | Age: 60
End: 2025-08-15

## 2025-08-15 ENCOUNTER — TELEPHONE (OUTPATIENT)
Dept: OTHER | Facility: OTHER | Age: 60
End: 2025-08-15

## 2025-08-19 ENCOUNTER — TELEPHONE (OUTPATIENT)
Dept: OTHER | Facility: OTHER | Age: 60
End: 2025-08-19